# Patient Record
Sex: FEMALE | Race: WHITE | NOT HISPANIC OR LATINO | Employment: FULL TIME | URBAN - METROPOLITAN AREA
[De-identification: names, ages, dates, MRNs, and addresses within clinical notes are randomized per-mention and may not be internally consistent; named-entity substitution may affect disease eponyms.]

---

## 2021-04-30 ENCOUNTER — TELEPHONE (OUTPATIENT)
Dept: OBGYN CLINIC | Facility: CLINIC | Age: 51
End: 2021-04-30

## 2021-04-30 DIAGNOSIS — Z79.890 HORMONE REPLACEMENT THERAPY (HRT): Primary | ICD-10-CM

## 2021-04-30 NOTE — TELEPHONE ENCOUNTER
Ronna freed requesting refill of hormone birth control  Per [facility] 0 5-0 1   Returned call to patient, [facility]  Patient in agreement to schedule wa for Yarely   to c/b to schedule  Dr Blade Grossman, please generate script  She would like 90 day supply

## 2021-05-01 PROBLEM — Z79.890 HORMONE REPLACEMENT THERAPY (HRT): Status: ACTIVE | Noted: 2021-05-01

## 2021-05-01 NOTE — TELEPHONE ENCOUNTER
Does pt want script for HRT sent to [facility] mail scripts or local [facility]?  Please confirm, both are listed in [facility] and no pharmacy is listed in [address] [place] [address] Rd

## 2021-05-03 RX ORDER — ESTRADIOL AND NORETHINDRONE ACETATE 1; .5 MG/1; MG/1
1 TABLET ORAL DAILY
Qty: 84 TABLET | Refills: 0 | Status: SHIPPED | OUTPATIENT
Start: 2021-05-03 | End: 2021-06-11

## 2021-06-10 PROBLEM — N39.46 MIXED STRESS AND URGE URINARY INCONTINENCE: Status: ACTIVE | Noted: 2021-06-10

## 2021-06-11 ENCOUNTER — ANNUAL EXAM (OUTPATIENT)
Dept: OBGYN CLINIC | Facility: CLINIC | Age: 51
End: 2021-06-11
Payer: COMMERCIAL

## 2021-06-11 VITALS
DIASTOLIC BLOOD PRESSURE: 68 MMHG | BODY MASS INDEX: 23.67 KG/M2 | WEIGHT: 156.2 LBS | SYSTOLIC BLOOD PRESSURE: 102 MMHG | HEIGHT: 68 IN

## 2021-06-11 DIAGNOSIS — Z01.419 ENCOUNTER FOR ANNUAL ROUTINE GYNECOLOGICAL EXAMINATION: Primary | ICD-10-CM

## 2021-06-11 DIAGNOSIS — Z79.890 HORMONE REPLACEMENT THERAPY (HRT): ICD-10-CM

## 2021-06-11 DIAGNOSIS — Z12.31 ENCOUNTER FOR SCREENING MAMMOGRAM FOR MALIGNANT NEOPLASM OF BREAST: ICD-10-CM

## 2021-06-11 PROCEDURE — 99396 PREV VISIT EST AGE 40-64: CPT | Performed by: OBSTETRICS & GYNECOLOGY

## 2021-06-11 RX ORDER — BLOOD-GLUCOSE METER
EACH MISCELLANEOUS
COMMUNITY
Start: 2021-04-19

## 2021-06-11 RX ORDER — ESCITALOPRAM OXALATE 20 MG/1
TABLET ORAL
COMMUNITY

## 2021-06-11 RX ORDER — LORAZEPAM 0.5 MG/1
0.5 TABLET ORAL
COMMUNITY

## 2021-06-11 NOTE — PATIENT INSTRUCTIONS
- Maintain healthy weight with BMI ideally between 18-25     - Eat a healthy diet, including multiple servings of vegetables and fruits, as well as lean protein sources  - Get at least 150 minutes of moderate aerobic activity or 75 minutes of vigorous aerobic activity a week, or a combination of moderate and vigorous activity  Greater amounts of exercise will provide an even greater health benefit  - Ensure diet provides 1200mg of Calcium daily (divided) and 800IU of vitamin D, or take supplements to meet this

## 2021-06-11 NOTE — LETTER
2021     Silvia Magallon DO  [address] [facility] 1    Patient: Karen Waldron   YOB: 1970   Date of Visit: 2021       Dear Dr Dodson Barrier: Thank you for referring Karen Waldron to me for evaluation  Below are my notes for this consultation  If you have questions, please do not hesitate to call me  I look forward to following your patient along with you  Sincerely,        Gregor Magallon MD        CC: No Recipients  Gregor Magallon MD  2021  5:48 PM  Sign when Signing Visit  Annual Wellness Visit  [address]  [address] [facility], [facility] 1    ASSESSMENT/PLAN: Karen Waldron is a 46 y o   who presents for annual gynecologic exam     Encounter for routine gynecologic examination  - Routine well woman exam completed today  - Cervical Cancer Screening: Current ASCCP Guidelines reviewed  Last Pap: 2019 neg with neg HPV  Next Pap Due: 1-2 years, routine   - Contraceptive counseling discussed  Current contraception: postmenopausal:   - Breast Cancer Screening: Last Mammogram   normal per pt  - Colorectal cancer screening last , next due  per pt  - The following were reviewed in today's visit: mammography screening ordered, adequate intake of calcium and vitamin D, exercise and healthy diet    Additional problems addressed during this visit:  1  Encounter for annual routine gynecological examination    2  Encounter for screening mammogram for malignant neoplasm of breast  -     Mammo screening bilateral w 3d & cad; Future; Expected date: 2022    3  Hormone replacement therapy (HRT)  Assessment & Plan:  Pt previously on HRT for hot flashes  Stopped in March this year  Some have returned but not as bad as previously  Pt wishes to try herbal options and see if they improve with time  Gave pt handout of herbal options to try  Pt can call to restart if she wishes          Next visit: 1 year wellness      CC:  Annual Gynecologic Examination    HPI: Ian Coburn is a 46 y o   who presents for annual gynecologic examination  She denies any breast, urinary or pelvic issues at today's visit  Patient reports she ran out of HRT early may and although I did refill she decided to try off  Hasn't taken since early May  Hot flashes have returned but not as severe  Willing to try herbal       Gyn History  No LMP recorded  Patient is postmenopausal   Menstrual History  Period Duration (Days): menopause at 48yo  Last Pap: 2019 was normal    She  reports being sexually active and has had partner(s) who are Male  She reports using the following method of birth control/protection: Female Sterilization  Sexual History  Sexually Transmitted Infection History: None  Is Patient in a Monogamous Relationship?: Yes    OB History      Past Medical History:  No date: Anxiety  No date: Depression  No date: Endometriosis of pelvis  10/2019: History of mammogram      Comment:  Negative per pt  Done at [facility]  No date:  Hot flashes      Comment:  on HRT  2019: Papanicolaou smear  No date: Stress incontinence      Comment:  worse with allergy season     Past Surgical History:  2014: BLADDER SUSPENSION      Comment:  for stress incontinence   2014: CYSTOURETHROSCOPY  2016: ELBOW SURGERY      Comment:  tennis elbow   1985: HERNIA REPAIR  2014: STERILIZATION      Comment:  Essure     Family History   Problem Relation Age of Onset    Breast cancer Neg Hx     Uterine cancer Neg Hx     Ovarian cancer Neg Hx     Colon cancer Neg Hx         Social History     Tobacco Use    Smoking status: Former Smoker    Smokeless tobacco: Never Used   Vaping Use    Vaping Use: Never used   Substance Use Topics    Alcohol use: Yes     Comment: special occasions (1 or less/month)     Drug use: Never     Comment: No           Current Outpatient Medications:     Blood Glucose Monitoring Suppl (ONE TOUCH ULTRA 2) w/Device KIT, Use as directed, Disp: , Rfl:     escitalopram (LEXAPRO) 20 mg tablet, TAKE 1 TABLET ONCE DAILY, Disp: , Rfl:     LORazepam (ATIVAN) 0 5 mg tablet, Take 0 5 mg by mouth, Disp: , Rfl:     She is allergic to sulfa antibiotics       ROS negative except as noted in HPI    Objective:  /68   Ht 5' 7 5" (1 715 m)   Wt 70 9 kg (156 lb 3 2 oz)   Breastfeeding No   BMI 24 10 kg/m²      Physical Exam  Constitutional:       Appearance: Normal appearance  Chest:      Breasts:         Right: Normal  No mass or tenderness  Left: Normal  No mass or tenderness  Abdominal:      Palpations: Abdomen is soft  Tenderness: There is no abdominal tenderness  Genitourinary:     General: Normal vulva  Vagina: No bleeding or lesions  Cervix: Normal       Uterus: Normal  Not tender  Adnexa:         Right: No mass or tenderness  Left: No mass or tenderness  Rectum: No mass or external hemorrhoid  Normal anal tone  Comments: Atrophic changes appropriate to age  Musculoskeletal:         General: Normal range of motion  Lymphadenopathy:      Upper Body:      Right upper body: No axillary adenopathy  Left upper body: No axillary adenopathy  Neurological:      Mental Status: She is alert and oriented to person, place, and time     Psychiatric:         Mood and Affect: Mood normal          Behavior: Behavior normal

## 2021-06-11 NOTE — PROGRESS NOTES
Annual Wellness Visit  [address]   [address] [facility], [facility] [place] [facility]    ASSESSMENT/PLAN: Chevy Du is a 46 y o   who presents for annual gynecologic exam     Encounter for routine gynecologic examination  - Routine well woman exam completed today  - Cervical Cancer Screening: Current ASCCP Guidelines reviewed  Last Pap: 2019 neg with neg HPV  Next Pap Due: 1-2 years, routine   - Contraceptive counseling discussed  Current contraception: postmenopausal:   - Breast Cancer Screening: Last Mammogram   normal per pt  - Colorectal cancer screening last , next due  per pt  - The following were reviewed in today's visit: mammography screening ordered, adequate intake of calcium and vitamin D, exercise and healthy diet    Additional problems addressed during this visit:  1  Encounter for annual routine gynecological examination    2  Encounter for screening mammogram for malignant neoplasm of breast  -     Mammo screening bilateral w 3d & cad; Future; Expected date: 2022    3  Hormone replacement therapy (HRT)  Assessment & Plan:  Pt previously on HRT for hot flashes  Stopped in March this year  Some have returned but not as bad as previously  Pt wishes to try herbal options and see if they improve with time  Gave pt handout of herbal options to try  Pt can call to restart if she wishes  Next visit: 1 year wellness      CC:  Annual Gynecologic Examination    HPI: Chevy Du is a 46 y o   who presents for annual gynecologic examination  She denies any breast, urinary or pelvic issues at today's visit  Patient reports she ran out of HRT early may and although I did refill she decided to try off  Hasn't taken since early May  Hot flashes have returned but not as severe  Willing to try herbal       Gyn History  No LMP recorded   Patient is postmenopausal   Menstrual History  Period Duration (Days): menopause at 50yo  Last Pap: 2019 was normal    She  reports being sexually active and has had partner(s) who are Male  She reports using the following method of birth control/protection: Female Sterilization  Sexual History  Sexually Transmitted Infection History: None  Is Patient in a Monogamous Relationship?: Yes    OB History      Past Medical History:  No date: Anxiety  No date: Depression  No date: Endometriosis of pelvis  10/2019: History of mammogram      Comment:  Negative per pt  Done at [facility]  No date: Hot flashes      Comment:  on HRT  2019: Papanicolaou smear  No date: Stress incontinence      Comment:  worse with allergy season     Past Surgical History:  2014: BLADDER SUSPENSION      Comment:  for stress incontinence   2014: CYSTOURETHROSCOPY  2016: ELBOW SURGERY      Comment:  tennis elbow   1985: HERNIA REPAIR  2014: STERILIZATION      Comment:  Essure     Family History   Problem Relation Age of Onset    Breast cancer Neg Hx     Uterine cancer Neg Hx     Ovarian cancer Neg Hx     Colon cancer Neg Hx         Social History     Tobacco Use    Smoking status: Former Smoker    Smokeless tobacco: Never Used   Vaping Use    Vaping Use: Never used   Substance Use Topics    Alcohol use: Yes     Comment: special occasions (1 or less/month)     Drug use: Never     Comment: No           Current Outpatient Medications:     Blood Glucose Monitoring Suppl (ONE TOUCH ULTRA 2) w/Device KIT, Use as directed, Disp: , Rfl:     escitalopram (LEXAPRO) 20 mg tablet, TAKE 1 TABLET ONCE DAILY, Disp: , Rfl:     LORazepam (ATIVAN) 0 5 mg tablet, Take 0 5 mg by mouth, Disp: , Rfl:     She is allergic to sulfa antibiotics       ROS negative except as noted in HPI    Objective:  /68   Ht 5' 7 5" (1 715 m)   Wt 70 9 kg (156 lb 3 2 oz)   Breastfeeding No   BMI 24 10 kg/m²      Physical Exam  Constitutional:       Appearance: Normal appearance  Chest:      Breasts:         Right: Normal  No mass or tenderness  Left: Normal  No mass or tenderness  Abdominal:      Palpations: Abdomen is soft  Tenderness: There is no abdominal tenderness  Genitourinary:     General: Normal vulva  Vagina: No bleeding or lesions  Cervix: Normal       Uterus: Normal  Not tender  Adnexa:         Right: No mass or tenderness  Left: No mass or tenderness  Rectum: No mass or external hemorrhoid  Normal anal tone  Comments: Atrophic changes appropriate to age  Musculoskeletal:         General: Normal range of motion  Lymphadenopathy:      Upper Body:      Right upper body: No axillary adenopathy  Left upper body: No axillary adenopathy  Neurological:      Mental Status: She is alert and oriented to person, place, and time     Psychiatric:         Mood and Affect: Mood normal          Behavior: Behavior normal

## 2021-06-19 NOTE — ASSESSMENT & PLAN NOTE
Pt previously on HRT for hot flashes  Stopped in March this year  Some have returned but not as bad as previously  Pt wishes to try herbal options and see if they improve with time  Gave pt handout of herbal options to try  Pt can call to restart if she wishes

## 2021-09-21 ENCOUNTER — VBI (OUTPATIENT)
Dept: ADMINISTRATIVE | Facility: OTHER | Age: 51
End: 2021-09-21

## 2021-09-21 NOTE — TELEPHONE ENCOUNTER
Upon review of the In Basket request we were able to locate, review, and update the patient chart as requested for Pap Smear (HPV) aka Cervical Cancer Screening  Any additional questions or concerns should be emailed to the Practice Liaisons via [email or web address] email, please do not reply via In Basket      Thank you  Dago Tovar

## 2022-07-28 NOTE — PATIENT INSTRUCTIONS
- Maintain healthy weight with BMI ideally between 18-25     - Eat a healthy diet, including multiple servings of vegetables and fruits, as well as lean protein sources  - Get at least 150 minutes of moderate aerobic activity or 75 minutes of vigorous aerobic activity a week, or a combination of moderate and vigorous activity  Greater amounts of exercise will provide an even greater health benefit  - Ensure diet provides 1200mg of Calcium daily (divided) and 800IU of vitamin D, or take supplements to meet this  - Safe sex practices recommended  - Resources - information on birth control and sexually transmitted infections - www bedsider  org            - information on sexually transmitted infections - www cdc gov/std/    Strong Bones at Every Age    What is Calcium and What Does it Do? Calcium is a mineral that is necessary for life  In addition to building bones and keeping them healthy, calcium helps our blood clot, nerves send messages and muscles contract  About 99 percent of the calcium in our bodies is in our bones and teeth  Each day, we lose calcium through our skin, nails, hair, sweat, urine and feces, but our bodies cannot produce new calcium  That's why it's important to try to get calcium from the food we eat  When we don't get enough calcium for our body's needs, it is taken from our bones  Daily Recommendations for Ca and Vit D (FDA, IOM)   Age Calcium Vitamin D   Teen - 54yo 1,000mg 600IU   50yo and older 1,200mg 800IU   Maximum Daily 2,000-2,500mg 4,000IU     Calcium should be spread out throughout the day for better absorption, ~500mg at a time  Find a Calcium Calculator @ www  osteoporosis  foundation/educational-hub/topic/calcium-calculator    Vitamin D: Vitamin D is in fish oils, some vegetables, and fortified milk, cereal, and bread  Vitamin D is also formed in the skin when it is exposed to the sun       Dietary Calcium Amounts  Food Amount     Calcium (mg)      Milk (skim, low fat, whole) 1 cup 300       Cottage Cheese 1/2 cup 65   Ice Cream or Ice Milk 1/2 cup 100   Sour Cream, cultured 1 cup 250   Soy Milk, calcium fortified 1 cup 200 to 400   Yogurt 1 cup 450   Yogurt drink 12 oz 300   Nonfat dry milk powder 5 Tbsp 300   Brie Cheese 1 oz 50   Hard Cheese (cheddar, nate)     1 oz 200   Mozzarella 1 oz 200   Parmesan Cheese 1 Tbsp 70   Broccoli, cooked 1 cup 180   Kale, raw 1 cup 55   Orange Juice, Ca++ fortified 1 cup 300   Spinach, cooked 1 cup 240

## 2022-07-29 ENCOUNTER — ANNUAL EXAM (OUTPATIENT)
Dept: OBGYN CLINIC | Facility: CLINIC | Age: 52
End: 2022-07-29
Payer: COMMERCIAL

## 2022-07-29 VITALS — WEIGHT: 150 LBS | SYSTOLIC BLOOD PRESSURE: 108 MMHG | BODY MASS INDEX: 23.15 KG/M2 | DIASTOLIC BLOOD PRESSURE: 60 MMHG

## 2022-07-29 DIAGNOSIS — Z01.419 ENCOUNTER FOR ANNUAL ROUTINE GYNECOLOGICAL EXAMINATION: Primary | ICD-10-CM

## 2022-07-29 DIAGNOSIS — Z79.890 HORMONE REPLACEMENT THERAPY (HRT): ICD-10-CM

## 2022-07-29 DIAGNOSIS — Z12.31 ENCOUNTER FOR SCREENING MAMMOGRAM FOR MALIGNANT NEOPLASM OF BREAST: ICD-10-CM

## 2022-07-29 DIAGNOSIS — Z12.4 CERVICAL CANCER SCREENING: ICD-10-CM

## 2022-07-29 PROCEDURE — S0612 ANNUAL GYNECOLOGICAL EXAMINA: HCPCS | Performed by: OBSTETRICS & GYNECOLOGY

## 2022-07-29 NOTE — PROGRESS NOTES
Annual Wellness Visit  [address]   [address], [facility], [facility] 1    ASSESSMENT/PLAN: Nathan Calix is a 45 y o   who presents for annual gynecologic exam     Encounter for routine gynecologic examination  - Routine well woman exam completed today  - Cervical Cancer Screening: Current ASCCP Guidelines reviewed  Last Pap: 2019 normal  Next Pap Due: today, routine   - Contraceptive counseling discussed  Current contraception: postmenopausal  - Breast Cancer Screening: Last Mammogram 10/2021 normal  - Colorectal cancer screening last , next due   - The following were reviewed in today's visit: mammography screening ordered, use and side effects of HRT, adequate intake of calcium and vitamin D, exercise and healthy diet    Additional problems addressed during this visit:  1  Encounter for annual routine gynecological examination    2  Encounter for screening mammogram for malignant neoplasm of breast  -     Mammo screening bilateral w 3d & cad; Future; Expected date: 2023    3  Cervical cancer screening  -     IGP, Aptima HPV, Rfx 16/18,45    4  Hormone replacement therapy (HRT)  Assessment & Plan:  Has been off of HRT for 1 year  Doing well  Was using black cohash but stopped and only occasional hot flashes  Doesn't feel she needs anything  Next visit: 1 year Wellness      CC:  Annual Gynecologic Examination    HPI: Nathan Calix is a 45 y o   who presents for annual gynecologic examination  She denies any breast, urinary or pelvic issues at today's visit  No postmenopausal bleeding  Hot flashes significantly decrease  Sexually active occasionally, some dryness  Uses lubricant  Gyn History  No LMP recorded  Patient is postmenopausal      Last Pap: 2019 was normal    She  reports being sexually active and has had partner(s) who are male   She reports using the following method of birth control/protection: Female Sterilization  OB History      Past Medical History:  No date: Anxiety and depression  No date: Endometriosis of pelvis  10/19/2021: History of screening mammography      Comment:  negative per pt  Done at [facility]  No date: Stress incontinence      Comment:  worse with allergy season     Past Surgical History:  2014: BLADDER SUSPENSION      Comment:  for stress incontinence   2014: CYSTOURETHROSCOPY  2016: ELBOW SURGERY      Comment:  tennis elbow   1985: HERNIA REPAIR  2014: STERILIZATION      Comment:  Essure     Family History   Problem Relation Age of Onset    Breast cancer Neg Hx     Uterine cancer Neg Hx     Ovarian cancer Neg Hx     Colon cancer Neg Hx         Social History     Tobacco Use    Smoking status: Former Smoker    Smokeless tobacco: Never Used   Vaping Use    Vaping Use: Never used   Substance Use Topics    Alcohol use: Yes     Comment: special occasions (1 or less/month)     Drug use: Never     Comment: No           Current Outpatient Medications:     Blood Glucose Monitoring Suppl (ONE TOUCH ULTRA 2) w/Device KIT, Use as directed, Disp: , Rfl:     escitalopram (LEXAPRO) 20 mg tablet, TAKE 1 TABLET ONCE DAILY, Disp: , Rfl:     LORazepam (ATIVAN) 0 5 mg tablet, Take 0 5 mg by mouth, Disp: , Rfl:     She is allergic to dust mite extract and sulfa antibiotics       ROS negative except as noted in HPI    Objective:  /60   Wt 68 kg (150 lb)   Breastfeeding No   BMI 23 15 kg/m²      Physical Exam  Constitutional:       Appearance: Normal appearance  Chest:   Breasts:      Right: Normal  No mass, tenderness or axillary adenopathy  Left: Normal  No mass, tenderness or axillary adenopathy  Abdominal:      Palpations: Abdomen is soft  Tenderness: There is no abdominal tenderness  Genitourinary:     General: Normal vulva  Vagina: No bleeding or lesions  Cervix: Normal       Uterus: Normal  Not tender         Adnexa:         Right: No mass or tenderness  Left: No mass or tenderness  Rectum: No mass or external hemorrhoid  Normal anal tone  Comments: Atrophic changes appropriate to age  Musculoskeletal:         General: Normal range of motion  Lymphadenopathy:      Upper Body:      Right upper body: No axillary adenopathy  Left upper body: No axillary adenopathy  Neurological:      Mental Status: She is alert and oriented to person, place, and time     Psychiatric:         Mood and Affect: Mood normal          Behavior: Behavior normal

## 2022-07-29 NOTE — LETTER
2022     Tarah Anes, [address] [facility],[address] [place] [address]    Patient: Vi Gerardo   YOB: 1970   Date of Visit: 2022       Dear Dr Liliana Francis: Thank you for referring Vi Gerardo to me for evaluation  Below are my notes for this consultation  If you have questions, please do not hesitate to call me  I look forward to following your patient along with you  Sincerely,        Garfield Quinteros MD        CC: No Recipients  Garfield Quinteros MD  2022  1:17 PM  Sign when Signing Visit  Annual Wellness Visit  [address]   [address], [address], [facility], [place] [address]    ASSESSMENT/PLAN: Vi Gerardo is a 45 y o   who presents for annual gynecologic exam     Encounter for routine gynecologic examination  - Routine well woman exam completed today  - Cervical Cancer Screening: Current ASCCP Guidelines reviewed  Last Pap: 2019 normal  Next Pap Due: today, routine   - Contraceptive counseling discussed  Current contraception: postmenopausal  - Breast Cancer Screening: Last Mammogram 10/2021 normal  - Colorectal cancer screening last , next due   - The following were reviewed in today's visit: mammography screening ordered, use and side effects of HRT, adequate intake of calcium and vitamin D, exercise and healthy diet    Additional problems addressed during this visit:  1  Encounter for annual routine gynecological examination    2  Encounter for screening mammogram for malignant neoplasm of breast  -     Mammo screening bilateral w 3d & cad; Future; Expected date: 2023    3  Cervical cancer screening  -     IGP, Aptima HPV, Rfx 16/18,45    4  Hormone replacement therapy (HRT)  Assessment & Plan:  Has been off of HRT for 1 year  Doing well  Was using black cohash but stopped and only occasional hot flashes  Doesn't feel she needs anything          Next visit: 1 year Wellness      CC:  Annual Gynecologic Examination    HPI: Destini Hood is a 45 y o   who presents for annual gynecologic examination  She denies any breast, urinary or pelvic issues at today's visit  No postmenopausal bleeding  Hot flashes significantly decrease  Sexually active occasionally, some dryness  Uses lubricant  Gyn History  No LMP recorded  Patient is postmenopausal      Last Pap: 2019 was normal    She  reports being sexually active and has had partner(s) who are male  She reports using the following method of birth control/protection: Female Sterilization  OB History      Past Medical History:  No date: Anxiety and depression  No date: Endometriosis of pelvis  10/19/2021: History of screening mammography      Comment:  negative per pt  Done at [facility]  No date: Stress incontinence      Comment:  worse with allergy season     Past Surgical History:  2014: BLADDER SUSPENSION      Comment:  for stress incontinence   2014: CYSTOURETHROSCOPY  2016: ELBOW SURGERY      Comment:  tennis elbow   1985: HERNIA REPAIR  2014: STERILIZATION      Comment:  Essure     Family History   Problem Relation Age of Onset    Breast cancer Neg Hx     Uterine cancer Neg Hx     Ovarian cancer Neg Hx     Colon cancer Neg Hx         Social History     Tobacco Use    Smoking status: Former Smoker    Smokeless tobacco: Never Used   Vaping Use    Vaping Use: Never used   Substance Use Topics    Alcohol use: Yes     Comment: special occasions (1 or less/month)     Drug use: Never     Comment: No           Current Outpatient Medications:     Blood Glucose Monitoring Suppl (ONE TOUCH ULTRA 2) w/Device KIT, Use as directed, Disp: , Rfl:     escitalopram (LEXAPRO) 20 mg tablet, TAKE 1 TABLET ONCE DAILY, Disp: , Rfl:     LORazepam (ATIVAN) 0 5 mg tablet, Take 0 5 mg by mouth, Disp: , Rfl:     She is allergic to dust mite extract and sulfa antibiotics       ROS negative except as noted in HPI    Objective:  /60   Wt 68 kg (150 lb)   Breastfeeding No   BMI 23 15 kg/m²      Physical Exam  Constitutional:       Appearance: Normal appearance  Chest:   Breasts:      Right: Normal  No mass, tenderness or axillary adenopathy  Left: Normal  No mass, tenderness or axillary adenopathy  Abdominal:      Palpations: Abdomen is soft  Tenderness: There is no abdominal tenderness  Genitourinary:     General: Normal vulva  Vagina: No bleeding or lesions  Cervix: Normal       Uterus: Normal  Not tender  Adnexa:         Right: No mass or tenderness  Left: No mass or tenderness  Rectum: No mass or external hemorrhoid  Normal anal tone  Comments: Atrophic changes appropriate to age  Musculoskeletal:         General: Normal range of motion  Lymphadenopathy:      Upper Body:      Right upper body: No axillary adenopathy  Left upper body: No axillary adenopathy  Neurological:      Mental Status: She is alert and oriented to person, place, and time     Psychiatric:         Mood and Affect: Mood normal          Behavior: Behavior normal

## 2022-07-29 NOTE — ASSESSMENT & PLAN NOTE
Has been off of HRT for 1 year  Doing well  Was using black cohash but stopped and only occasional hot flashes  Doesn't feel she needs anything

## 2022-08-02 LAB
CYTOLOGIST CVX/VAG CYTO: NORMAL
DX ICD CODE: NORMAL
HPV I/H RISK 4 DNA CVX QL PROBE+SIG AMP: NEGATIVE
OTHER STN SPEC: NORMAL
PATH REPORT.FINAL DX SPEC: NORMAL
SL AMB NOTE:: NORMAL
SL AMB SPECIMEN ADEQUACY: NORMAL
SL AMB TEST METHODOLOGY: NORMAL

## 2023-08-24 ENCOUNTER — TELEPHONE (OUTPATIENT)
Age: 53
End: 2023-08-24

## 2023-08-24 ENCOUNTER — PREP FOR PROCEDURE (OUTPATIENT)
Age: 53
End: 2023-08-24

## 2023-08-24 DIAGNOSIS — Z86.010 HISTORY OF COLON POLYPS: Primary | ICD-10-CM

## 2023-08-24 NOTE — TELEPHONE ENCOUNTER
[address] [facility],[address] Assessment    Name: Tommy Chávez  YOB: 1970  Last Height: 5' 8"   Last weight:(140 lb)  BMI: 21.3  Procedure: Colon  Diagnosis: history of polyps  Date of procedure: 9/26/23  Prep: miralax/Dulcolax  Responsible : Jhon Proctor  Phone#: [phone]  Name completing form: Debbi Fuentes  Date form completed: 08/24/23      If the patient answers yes to any of these questions, schedule in a hospital  Are you pregnant: No  Do you rely on a wheelchair for mobility: No  Have you been diagnosed with End Stage Renal Disease (ESRD): No  Do you need oxygen during the day: No  Have you had a heart attack or stroke within the past three months: No  Have you had a seizure within the past three months: No  Have you ever been informed by anesthesia that you have a difficult airway: No  Additional Questions  Have you had any cardiac testing or are under the care of a Cardiologist (see cardiac list): No  Cardiac list:   Do you have an implanted cardiac defibrillator: No (Comment:  This patient should be scheduled in the hospital)    Have any bleeding problems, such as anemia or hemophilia (If patient has H&H result below 8, schedule in hospital.  H&H must be within 30 days of procedure): No    Had an organ transplant within the past 3 months: No    Do you have any present infections: No  Do you get short of breath when walking a few blocks: No  Have you been diagnosed with diabetes: No  Comments (provide cardiac provider information if applicable):

## 2023-08-24 NOTE — TELEPHONE ENCOUNTER
Scheduled date of colonoscopy (as of today): 09/26/2023  Physician performing colonoscopy: Dr Kaleb Segura  Location of colonoscopy: [facility]  Bowel prep reviewed with patient: Miralax/Dulcolax  Prep instrutions emailed to pt    Clearances: N/A

## 2023-08-24 NOTE — TELEPHONE ENCOUNTER
08/24/23  Screened by: Frandy Monzon    Referring Provider     Pre- Screening: There is no height or weight on file to calculate BMI. Has patient been referred for a routine screening Colonoscopy? yes  Is the patient between 43-73 years old? yes      Previous Colonoscopy yes   If yes:    Date: 5yrs ago august    Facility:     Reason:       SCHEDULING STAFF: If the patient is between 45yrs-49yrs, please advise patient to confirm benefits/coverage with their insurance company for a routine screening colonoscopy, some insurance carriers will only cover at [address] [place] [address] or older. If the patient is over 66years old, please schedule an office visit. Does the patient want to see a Gastroenterologist prior to their procedure OR are they having any GI symptoms? no    Has the patient been hospitalized or had abdominal surgery in the past 6 months? no    Does the patient use supplemental oxygen? no    Does the patient take Coumadin, Lovenox, Plavix, Elliquis, Xarelto, or other blood thinning medication? no    Has the patient had a stroke, cardiac event, or stent placed in the past year? no     Passed OA    SCHEDULING STAFF: If patient answers NO to above questions, then schedule procedure. If patient answers YES to above questions, then schedule office appointment. If patient is between 45yrs - 49yrs, please advise patient that we will have to confirm benefits & coverage with their insurance company for a routine screening colonoscopy.

## 2023-09-19 ENCOUNTER — TELEPHONE (OUTPATIENT)
Dept: GASTROENTEROLOGY | Facility: CLINIC | Age: 53
End: 2023-09-19

## 2023-09-19 NOTE — TELEPHONE ENCOUNTER
Procedure confirmed  Colonoscopy     Via: Spoke with patient. Instructions given: Email     Prep Given: Miralax/Dulcolax    Call the office if there are any questions.

## 2023-09-26 ENCOUNTER — HOSPITAL ENCOUNTER (OUTPATIENT)
Dept: GASTROENTEROLOGY | Facility: AMBULATORY SURGERY CENTER | Age: 53
Discharge: HOME | End: 2023-09-26
Attending: INTERNAL MEDICINE
Payer: COMMERCIAL

## 2023-09-26 ENCOUNTER — ANESTHESIA (OUTPATIENT)
Dept: GASTROENTEROLOGY | Facility: AMBULATORY SURGERY CENTER | Age: 53
End: 2023-09-26

## 2023-09-26 ENCOUNTER — ANESTHESIA EVENT (OUTPATIENT)
Dept: GASTROENTEROLOGY | Facility: AMBULATORY SURGERY CENTER | Age: 53
End: 2023-09-26

## 2023-09-26 VITALS
SYSTOLIC BLOOD PRESSURE: 134 MMHG | RESPIRATION RATE: 16 BRPM | HEIGHT: 68 IN | TEMPERATURE: 97.8 F | WEIGHT: 145 LBS | DIASTOLIC BLOOD PRESSURE: 58 MMHG | BODY MASS INDEX: 21.98 KG/M2 | OXYGEN SATURATION: 98 % | HEART RATE: 58 BPM

## 2023-09-26 DIAGNOSIS — Z86.010 HISTORY OF COLON POLYPS: ICD-10-CM

## 2023-09-26 PROBLEM — F32.A ANXIETY AND DEPRESSION: Status: ACTIVE | Noted: 2023-09-26

## 2023-09-26 PROBLEM — F41.9 ANXIETY AND DEPRESSION: Status: ACTIVE | Noted: 2023-09-26

## 2023-09-26 PROCEDURE — 45380 COLONOSCOPY AND BIOPSY: CPT | Performed by: INTERNAL MEDICINE

## 2023-09-26 PROCEDURE — 88305 TISSUE EXAM BY PATHOLOGIST: CPT | Performed by: SPECIALIST

## 2023-09-26 RX ORDER — PROPOFOL 10 MG/ML
INJECTION, EMULSION INTRAVENOUS AS NEEDED
Status: DISCONTINUED | OUTPATIENT
Start: 2023-09-26 | End: 2023-09-26

## 2023-09-26 RX ORDER — SODIUM CHLORIDE, SODIUM LACTATE, POTASSIUM CHLORIDE, CALCIUM CHLORIDE 600; 310; 30; 20 MG/100ML; MG/100ML; MG/100ML; MG/100ML
50 INJECTION, SOLUTION INTRAVENOUS CONTINUOUS
Status: DISCONTINUED | OUTPATIENT
Start: 2023-09-26 | End: 2023-09-30 | Stop reason: HOSPADM

## 2023-09-26 RX ADMIN — SODIUM CHLORIDE, SODIUM LACTATE, POTASSIUM CHLORIDE, CALCIUM CHLORIDE 50 ML/HR: 600; 310; 30; 20 INJECTION, SOLUTION INTRAVENOUS at 09:09

## 2023-09-26 RX ADMIN — PROPOFOL 100 MG: 10 INJECTION, EMULSION INTRAVENOUS at 09:32

## 2023-09-26 RX ADMIN — PROPOFOL 50 MG: 10 INJECTION, EMULSION INTRAVENOUS at 09:36

## 2023-09-26 RX ADMIN — SODIUM CHLORIDE, SODIUM LACTATE, POTASSIUM CHLORIDE, CALCIUM CHLORIDE: 600; 310; 30; 20 INJECTION, SOLUTION INTRAVENOUS at 09:46

## 2023-09-26 RX ADMIN — PROPOFOL 100 MG: 10 INJECTION, EMULSION INTRAVENOUS at 09:30

## 2023-09-26 NOTE — H&P
History and Physical - [facility]  Aaliyah Vega 52 y.o. female MRN: [id number]    HPI: Aaliyah Vega is a 52 y.o. female who presents for colon polyp surveillance colonoscopy. She had a colonoscopy in 2018 that showed a tubular adenoma    REVIEW OF SYSTEMS: Per the HPI, and otherwise unremarkable. Historical Information   Past Medical History:   Diagnosis Date   • Anxiety and depression    • Colon polyp     pt not sure but told to return in 5 years   • Endometriosis of pelvis    • History of screening mammography 10/19/2021    negative per pt. Done at [facility].    • Stress incontinence     worse with allergy season     Past Surgical History:   Procedure Laterality Date   • BLADDER SUSPENSION      for stress incontinence    • CYSTOURETHROSCOPY     • ELBOW SURGERY  2016    tennis elbow    • HERNIA REPAIR     • STERILIZATION      Essure     Social History   Social History     Substance and Sexual Activity   Alcohol Use Yes   • Alcohol/week: 2.0 standard drinks of alcohol   • Types: 2 Glasses of wine per week    Comment: daily     Social History     Substance and Sexual Activity   Drug Use Never    Comment: No      Social History     Tobacco Use   Smoking Status Former   • Types: Cigarettes   • Quit date:    • Years since quittin.7   Smokeless Tobacco Never     Family History   Problem Relation Age of Onset   • Breast cancer Neg Hx    • Uterine cancer Neg Hx    • Ovarian cancer Neg Hx    • Colon cancer Neg Hx        Meds/Allergies       Current Outpatient Medications:   •  Blood Glucose Monitoring Suppl (ONE TOUCH ULTRA 2) w/Device KIT  •  escitalopram (LEXAPRO) 20 mg tablet  •  LORazepam (ATIVAN) 0.5 mg tablet    Current Facility-Administered Medications:   •  lactated ringers infusion, 50 mL/hr, Intravenous, Continuous, Continue from Pre-op at 23 0919    Allergies   Allergen Reactions   • Dust Mite Extract Other (See Comments)   • Sulfa Antibiotics Nausea Only Objective     /56   Pulse 62   Temp 97.8 °F (36.6 °C) (Temporal)   Resp 18   Ht 5' 8" (1.727 m)   Wt 65.8 kg (145 lb)   SpO2 100%   BMI 22.05 kg/m²     PHYSICAL EXAM    Gen: NAD AAOx3  Head: Normocephalic, Atraumatic  CV: S1S2 RRR no m/r/g  CHEST: Clear b/l no c/r/w  ABD: soft, +BS NT/ND  EXT: no edema    ASSESSMENT/PLAN:  This is a 52y.o. year old female here for colon polyp surveillance colonoscopy, and she is stable and optimized for her procedure.

## 2023-09-26 NOTE — ANESTHESIA POSTPROCEDURE EVALUATION
Post-Op Assessment Note    CV Status:  Stable  Pain Score: 0    Pain management: adequate     Mental Status:  Alert and awake   Hydration Status:  Euvolemic   PONV Controlled:  Controlled   Airway Patency:  Patent      Post Op Vitals Reviewed: Yes      Staff: CRNA         No notable events documented.     BP   106/58   Temp   98   Pulse  57   Resp   16   SpO2   99

## 2023-09-26 NOTE — ANESTHESIA PREPROCEDURE EVALUATION
Procedure:  COLONOSCOPY    Relevant Problems   ANESTHESIA (within normal limits)   (-) History of anesthesia complications      CARDIO   (-) Chest pain   (-) ALBARADO (dyspnea on exertion)      NEURO/PSYCH   (+) Anxiety and depression      PULMONARY   (-) Shortness of breath   (-) URI (upper respiratory infection)        Physical Exam    Airway    Mallampati score: II  TM Distance: >3 FB  Neck ROM: full     Dental       Cardiovascular      Pulmonary      Other Findings        Anesthesia Plan  ASA Score- 2     Anesthesia Type- IV sedation with anesthesia with ASA Monitors. Additional Monitors:   Airway Plan:           Plan Factors-Exercise tolerance (METS): >4 METS. Chart reviewed. EKG reviewed. Existing labs reviewed. Patient summary reviewed. Induction- intravenous. Postoperative Plan-     Informed Consent- Anesthetic plan and risks discussed with patient. I personally reviewed this patient with the CRNA. Discussed and agreed on the Anesthesia Plan with the CRNA. Tory Pritchard

## 2023-09-30 PROCEDURE — 88305 TISSUE EXAM BY PATHOLOGIST: CPT | Performed by: SPECIALIST

## 2023-12-07 NOTE — PATIENT INSTRUCTIONS
- Maintain healthy weight with BMI ideally between 18-25.    - Eat a healthy diet, including multiple servings of vegetables and fruits, as well as lean protein sources. - Get at least 150 minutes of moderate aerobic activity or 75 minutes of vigorous aerobic activity a week, or a combination of moderate and vigorous activity. Greater amounts of exercise will provide an even greater health benefit. - Ensure diet provides 1200mg of Calcium daily (divided) and 800IU of vitamin D, or take supplements to meet this. - Safe sex practices recommended. - Resources - information on birth control and sexually transmitted infections - www.bedsider. org            - information on sexually transmitted infections - www.cdc.gov/std/     Strong Bones at Every Age    What is Calcium and What Does it Do? Calcium is a mineral that is necessary for life. In addition to building bones and keeping them healthy, calcium helps our blood clot, nerves send messages and muscles contract. About 99 percent of the calcium in our bodies is in our bones and teeth. Each day, we lose calcium through our skin, nails, hair, sweat, urine and feces, but our bodies cannot produce new calcium. That's why it's important to try to get calcium from the food we eat. When we don't get enough calcium for our body's needs, it is taken from our bones. Daily Recommendations for Ca and Vit D (FDA, IOM)   Age Calcium Vitamin D   Teen - 52yo 1,000mg 600IU   52yo and older 1,200mg 800IU   Maximum Daily 2,000-2,500mg 4,000IU     Calcium should be spread out throughout the day for better absorption, ~500mg at a time. Find a Calcium Calculator @ www. osteoporosis. foundation/educational-hub/topic/calcium-calculator    Vitamin D: Vitamin D is in fish oils, some vegetables, and fortified milk, cereal, and bread. Vitamin D is also formed in the skin when it is exposed to the sun.      Dietary Calcium Amounts  Food Amount     Calcium (mg)      Milk (skim, low fat, whole) 1 cup 300       Cottage Cheese 1/2 cup 65   Ice Cream or Ice Milk 1/2 cup 100   Sour Cream, cultured 1 cup 250   Soy Milk, calcium fortified 1 cup 200 to 400   Yogurt 1 cup 450   Yogurt drink 12 oz 300   Nonfat dry milk powder 5 Tbsp 300   Brie Cheese 1 oz 50   Hard Cheese (cheddar, nate)     1 oz 200   Mozzarella 1 oz 200   Parmesan Cheese 1 Tbsp 70   Broccoli, cooked 1 cup 180   Kale, raw 1 cup 55   Orange Juice, Ca++ fortified 1 cup 300   Spinach, cooked 1 cup 240

## 2023-12-08 ENCOUNTER — ANNUAL EXAM (OUTPATIENT)
Dept: OBGYN CLINIC | Facility: CLINIC | Age: 53
End: 2023-12-08
Payer: COMMERCIAL

## 2023-12-08 VITALS
WEIGHT: 142.6 LBS | HEIGHT: 67 IN | DIASTOLIC BLOOD PRESSURE: 60 MMHG | SYSTOLIC BLOOD PRESSURE: 118 MMHG | BODY MASS INDEX: 22.38 KG/M2

## 2023-12-08 DIAGNOSIS — Z12.31 ENCOUNTER FOR SCREENING MAMMOGRAM FOR MALIGNANT NEOPLASM OF BREAST: ICD-10-CM

## 2023-12-08 DIAGNOSIS — Z23 FLU VACCINE NEED: ICD-10-CM

## 2023-12-08 DIAGNOSIS — Z01.419 ENCOUNTER FOR ANNUAL ROUTINE GYNECOLOGICAL EXAMINATION: Primary | ICD-10-CM

## 2023-12-08 PROCEDURE — 90471 IMMUNIZATION ADMIN: CPT | Performed by: OBSTETRICS & GYNECOLOGY

## 2023-12-08 PROCEDURE — S0612 ANNUAL GYNECOLOGICAL EXAMINA: HCPCS | Performed by: OBSTETRICS & GYNECOLOGY

## 2023-12-08 PROCEDURE — 90686 IIV4 VACC NO PRSV 0.5 ML IM: CPT | Performed by: OBSTETRICS & GYNECOLOGY

## 2023-12-08 RX ORDER — DIPHENHYDRAMINE HCL 25 MG
CAPSULE ORAL EVERY 24 HOURS
COMMUNITY

## 2023-12-08 NOTE — PROGRESS NOTES
Annual Wellness Visit  [address]  [address] [facility], [address], [place], [address] [facility]    ASSESSMENT/PLAN: Jose Guadalupe Balderrama is a 52 y.o.  who presents for annual gynecologic exam.    Encounter for routine gynecologic examination  - Routine well woman exam completed today. - Cervical Cancer Screening: Current ASCCP Guidelines reviewed. Last Pap: 2022 normal. Next Pap Due: 2 years, routine.  - Contraceptive counseling discussed. Current contraception: postmenopausal  - Breast Cancer Screening: Last Mammogram 10/2023 normal per pt  - Colorectal cancer screening last 2023, next due . - The following were reviewed in today's visit: mammography screening ordered, adequate intake of calcium and vitamin D, exercise, and healthy diet    Additional problems addressed during this visit:  1. Encounter for annual routine gynecological examination    2. Encounter for screening mammogram for malignant neoplasm of breast  -     Mammo screening bilateral w 3d & cad; Future    3. Flu vaccine need  -     influenza vaccine, quadrivalent, 0.5 mL, preservative-free, for adult and pediatric patients 6 mos+ (AFLURIA, FLUARIX, FLULAVAL, FLUZONE)        Next visit: 1 year Wellness      CC:  Annual Gynecologic Examination    HPI: Jose Guadalupe Balderrama is a 52 y.o.  who presents for annual gynecologic examination. She denies any breast, urinary or pelvic issues at today's visit. Sexually active but no often, not interested. That's okay with her. Gyn History  No LMP recorded. Patient is postmenopausal.     Last Pap: 2022 was normal    She  reports being sexually active and has had partner(s) who are male. She reports using the following method of birth control/protection: Female Sterilization. OB History      Past Medical History:  No date:  Anxiety and depression  No date: Colon polyp      Comment:  pt not sure but told to return in 5 years  10/2023: COVID-19  No date: Endometriosis of pelvis  10/2023: History of screening mammography      Comment:  Negative per pt. Done at [facility]  No date: Stress incontinence      Comment:  worse with allergy season     Past Surgical History:  2014: BLADDER SUSPENSION      Comment:  for stress incontinence   2014: CYSTOURETHROSCOPY  2016: ELBOW SURGERY      Comment:  tennis elbow   1985: HERNIA REPAIR  2014: STERILIZATION      Comment:  Essure     Family History   Problem Relation Age of Onset    Breast cancer Neg Hx     Uterine cancer Neg Hx     Ovarian cancer Neg Hx     Colon cancer Neg Hx         Social History     Tobacco Use    Smoking status: Former     Types: Cigarettes     Quit date:      Years since quittin.9    Smokeless tobacco: Never   Vaping Use    Vaping Use: Never used   Substance Use Topics    Alcohol use: Yes     Alcohol/week: 2.0 standard drinks of alcohol     Types: 2 Glasses of wine per week     Comment: daily    Drug use: Never     Comment: No           Current Outpatient Medications:     Blood Glucose Monitoring Suppl (ONE TOUCH ULTRA 2) w/Device KIT, Use as directed, Disp: , Rfl:     diphenhydrAMINE (BENADRYL) 25 mg capsule, every 24 hours, Disp: , Rfl:     escitalopram (LEXAPRO) 20 mg tablet, TAKE 1 TABLET ONCE DAILY, Disp: , Rfl:     LORazepam (ATIVAN) 0.5 mg tablet, Take 0.5 mg by mouth, Disp: , Rfl:     She is allergic to dust mite extract and sulfa antibiotics. .    ROS negative except as noted in HPI    Objective:  /60   Ht 5' 6.5" (1.689 m)   Wt 64.7 kg (142 lb 9.6 oz)   Breastfeeding No   BMI 22.67 kg/m²      Physical Exam  Constitutional:       Appearance: Normal appearance. Chest:   Breasts:     Right: Normal. No mass or tenderness. Left: Normal. No mass or tenderness. Abdominal:      Palpations: Abdomen is soft. Tenderness: There is no abdominal tenderness. Genitourinary:     General: Normal vulva. Vagina: No bleeding or lesions.       Cervix: Normal.      Uterus: Normal. Not tender. Adnexa:         Right: No mass or tenderness. Left: No mass or tenderness. Rectum: No mass or external hemorrhoid. Normal anal tone. Comments: Atrophic changes appropriate to age  Musculoskeletal:         General: Normal range of motion. Lymphadenopathy:      Upper Body:      Right upper body: No axillary adenopathy. Left upper body: No axillary adenopathy. Neurological:      Mental Status: She is alert and oriented to person, place, and time.    Psychiatric:         Mood and Affect: Mood normal.         Behavior: Behavior normal.

## 2023-12-08 NOTE — LETTER
2023     Jaspal Cervantes, [address] [facility] [address] [facility]    Patient: Viktoria Qureshi   YOB: 1970   Date of Visit: 2023       Dear Dr. Tiffany Pereira: Thank you for referring Viktoria Qureshi to me for evaluation. Below are my notes for this consultation. If you have questions, please do not hesitate to call me. I look forward to following your patient along with you. Sincerely,        Mary Ann Vargas MD        CC: No Recipients    Mary Ann Vargas MD  2023 12:50 PM  Sign when Signing Visit  Annual Wellness Visit  [address]  [address] [facility], [address], [facility], [address] [facility]    ASSESSMENT/PLAN: Viktoria Qurehsi is a 52 y.o.  who presents for annual gynecologic exam.    Encounter for routine gynecologic examination  - Routine well woman exam completed today. - Cervical Cancer Screening: Current ASCCP Guidelines reviewed. Last Pap: 2022 normal. Next Pap Due: 2 years, routine.  - Contraceptive counseling discussed. Current contraception: postmenopausal  - Breast Cancer Screening: Last Mammogram 10/2023 normal per pt  - Colorectal cancer screening last 2023, next due . - The following were reviewed in today's visit: mammography screening ordered, adequate intake of calcium and vitamin D, exercise, and healthy diet    Additional problems addressed during this visit:  1. Encounter for annual routine gynecological examination    2. Encounter for screening mammogram for malignant neoplasm of breast  -     Mammo screening bilateral w 3d & cad; Future    3. Flu vaccine need  -     influenza vaccine, quadrivalent, 0.5 mL, preservative-free, for adult and pediatric patients 6 mos+ (AFLURIA, FLUARIX, FLULAVAL, FLUZONE)        Next visit: 1 year Wellness      CC:  Annual Gynecologic Examination    HPI: Viktoria Qureshi is a 52 y.o.  who presents for annual gynecologic examination.   She denies any breast, urinary or pelvic issues at today's visit. Sexually active but no often, not interested. That's okay with her. Gyn History  No LMP recorded. Patient is postmenopausal.     Last Pap: 2022 was normal    She  reports being sexually active and has had partner(s) who are male. She reports using the following method of birth control/protection: Female Sterilization. OB History      Past Medical History:  No date: Anxiety and depression  No date: Colon polyp      Comment:  pt not sure but told to return in 5 years  10/2023: COVID-19  No date: Endometriosis of pelvis  10/2023: History of screening mammography      Comment:  Negative per pt. Done at [facility]  No date: Stress incontinence      Comment:  worse with allergy season     Past Surgical History:  2014: BLADDER SUSPENSION      Comment:  for stress incontinence   2014: CYSTOURETHROSCOPY  2016: ELBOW SURGERY      Comment:  tennis elbow   1985: HERNIA REPAIR  2014: STERILIZATION      Comment:  Essure     Family History   Problem Relation Age of Onset   • Breast cancer Neg Hx    • Uterine cancer Neg Hx    • Ovarian cancer Neg Hx    • Colon cancer Neg Hx         Social History     Tobacco Use   • Smoking status: Former     Types: Cigarettes     Quit date:      Years since quittin.9   • Smokeless tobacco: Never   Vaping Use   • Vaping Use: Never used   Substance Use Topics   • Alcohol use: Yes     Alcohol/week: 2.0 standard drinks of alcohol     Types: 2 Glasses of wine per week     Comment: daily   • Drug use: Never     Comment: No           Current Outpatient Medications:   •  Blood Glucose Monitoring Suppl (ONE TOUCH ULTRA 2) w/Device KIT, Use as directed, Disp: , Rfl:   •  diphenhydrAMINE (BENADRYL) 25 mg capsule, every 24 hours, Disp: , Rfl:   •  escitalopram (LEXAPRO) 20 mg tablet, TAKE 1 TABLET ONCE DAILY, Disp: , Rfl:   •  LORazepam (ATIVAN) 0.5 mg tablet, Take 0.5 mg by mouth, Disp: , Rfl:     She is allergic to dust mite extract and sulfa antibiotics. .    ROS negative except as noted in HPI    Objective:  /60   Ht 5' 6.5" (1.689 m)   Wt 64.7 kg (142 lb 9.6 oz)   Breastfeeding No   BMI 22.67 kg/m²      Physical Exam  Constitutional:       Appearance: Normal appearance. Chest:   Breasts:     Right: Normal. No mass or tenderness. Left: Normal. No mass or tenderness. Abdominal:      Palpations: Abdomen is soft. Tenderness: There is no abdominal tenderness. Genitourinary:     General: Normal vulva. Vagina: No bleeding or lesions. Cervix: Normal.      Uterus: Normal. Not tender. Adnexa:         Right: No mass or tenderness. Left: No mass or tenderness. Rectum: No mass or external hemorrhoid. Normal anal tone. Comments: Atrophic changes appropriate to age  Musculoskeletal:         General: Normal range of motion. Lymphadenopathy:      Upper Body:      Right upper body: No axillary adenopathy. Left upper body: No axillary adenopathy. Neurological:      Mental Status: She is alert and oriented to person, place, and time.    Psychiatric:         Mood and Affect: Mood normal.         Behavior: Behavior normal.

## 2025-01-17 ENCOUNTER — ANNUAL EXAM (OUTPATIENT)
Dept: OBGYN CLINIC | Facility: CLINIC | Age: 55
End: 2025-01-17
Payer: COMMERCIAL

## 2025-01-17 VITALS
WEIGHT: 156.4 LBS | DIASTOLIC BLOOD PRESSURE: 76 MMHG | BODY MASS INDEX: 23.7 KG/M2 | HEIGHT: 68 IN | SYSTOLIC BLOOD PRESSURE: 136 MMHG

## 2025-01-17 DIAGNOSIS — Z01.419 ENCOUNTER FOR ANNUAL ROUTINE GYNECOLOGICAL EXAMINATION: Primary | ICD-10-CM

## 2025-01-17 DIAGNOSIS — Z12.31 ENCOUNTER FOR SCREENING MAMMOGRAM FOR MALIGNANT NEOPLASM OF BREAST: ICD-10-CM

## 2025-01-17 PROCEDURE — S0612 ANNUAL GYNECOLOGICAL EXAMINA: HCPCS | Performed by: OBSTETRICS & GYNECOLOGY

## 2025-01-17 NOTE — LETTER
"2025     Ronna Pratt, DO  [address] [address]    Patient: Ronna Ortiz   YOB: 1970   Date of Visit: 2025       Dear Dr. Pratt:    Thank you for referring Ronna Ortiz to me for evaluation. Below are my notes for this consultation.    If you have questions, please do not hesitate to call me. I look forward to following your patient along with you.         Sincerely,        Natalia Hartmann MD        CC: No Recipients    Natalia Hartmann MD  2025  8:43 AM  Sign when Signing Visit  Annual Wellness Visit  [facility] - [address], [address], [place] [address]    ASSESSMENT/PLAN: Ronna Ortiz is a 54 y.o.  who presents for annual gynecologic exam.    Assessment & Plan  Encounter for annual routine gynecological examination  - Routine well woman exam completed today.  - Cervical Cancer Screening: Current ASCCP Guidelines reviewed. Last Pap: 2022 normal. Past abnormal pap: none.  Next Pap Due: 2 years.  - Contraceptive counseling discussed.  Current contraception: postmenopausal  - Breast Cancer Screening: Last Mammogram 10/2023 per pt normal  - Colorectal cancer screening last , next due .  - The following were reviewed in today's visit: mammography screening ordered, menopause, adequate intake of calcium and vitamin D, exercise, and healthy diet       Encounter for screening mammogram for malignant neoplasm of breast  Briefly reviewed dense breasts - pt states PCP ordered ultrasound in addition to mammogram for dense breasts.  I do not have mammo report to confirm.  Gave handout on breast density in AVS  Orders:  •  Mammo screening bilateral w 3d and cad; Future      Next visit: 1 year Wellness      CC:  Annual Gynecologic Examination    HPI: Ronna Ortiz is a 54 y.o.  who presents for annual gynecologic examination.  She denies any breast or urinary issues at today's visit.    \"Some skin growths outside " "vagina\", noted recently.  Kind of like skin tags.  No pain.    Sexually active but less frequent, no new partners.  No issues.    Still occasional hot flashes.  Was taking supplements in past, may restart.    Dr. Pratt ordered breast u/s as well for dense breasts.  No copy of last mammo in chart.        Gyn History  No LMP recorded. Patient is postmenopausal.     Last Pap: 2022 was normal    She  reports being sexually active and has had partner(s) who are male. She reports using the following method of birth control/protection: Female Sterilization.       OB History      Past Medical History:  No date: Anxiety and depression      Comment:  on medication.  managed by PCP  No date: Colon polyp      Comment:  pt not sure but told to return in 5 years  10/2023: COVID-19  No date: Endometriosis of pelvis  No date: Stress incontinence      Comment:  worse with allergy season     Past Surgical History:  2014: BLADDER SUSPENSION      Comment:  for stress incontinence   2014: CYSTOURETHROSCOPY  2016: ELBOW SURGERY      Comment:  tennis elbow   1985: HERNIA REPAIR  2014: STERILIZATION      Comment:  Essure     Family History   Problem Relation Age of Onset   • No Known Problems Mother    • Hyperlipidemia Father    • Hypertension Father    • Alcohol abuse Sister    • Alcohol abuse Brother    • No Known Problems Daughter    • No Known Problems Son    • Dementia Maternal Grandmother    • Alcohol abuse Maternal Grandfather    • Heart failure Paternal Grandmother    • Heart attack Paternal Grandfather    • Breast cancer Paternal Aunt    • Uterine cancer Neg Hx    • Ovarian cancer Neg Hx    • Colon cancer Neg Hx         Social History     Tobacco Use   • Smoking status: Former     Current packs/day: 0.00     Types: Cigarettes     Quit date:      Years since quittin.0   • Smokeless tobacco: Never   Vaping Use   • Vaping status: Never Used   Substance Use Topics   • Alcohol use: Yes     Alcohol/week: 2.0 " "standard drinks of alcohol     Types: 2 Glasses of wine per week     Comment: daily   • Drug use: Never     Comment: No           Current Outpatient Medications:   •  diphenhydrAMINE (BENADRYL) 25 mg capsule, every 24 hours, Disp: , Rfl:   •  LORazepam (ATIVAN) 0.5 mg tablet, Take 0.5 mg by mouth, Disp: , Rfl:   •  sertraline (ZOLOFT) 50 mg tablet, take 1 tablet by mouth every day at bedtime for 90 days, Disp: , Rfl:   •  Blood Glucose Monitoring Suppl (ONE TOUCH ULTRA 2) w/Device KIT, Use as directed (Patient not taking: Reported on 1/17/2025), Disp: , Rfl:     She is allergic to dust mite extract and sulfa antibiotics..    ROS negative except as noted in HPI    Objective:  /76 (BP Location: Left arm, Patient Position: Sitting, Cuff Size: Standard)   Ht 5' 7.75\" (1.721 m)   Wt 70.9 kg (156 lb 6.4 oz)   BMI 23.96 kg/m²      Physical Exam  Constitutional:       Appearance: Normal appearance.   Chest:   Breasts:     Right: Normal. No mass or tenderness.      Left: Normal. No mass or tenderness.   Abdominal:      Palpations: Abdomen is soft.      Tenderness: There is no abdominal tenderness.   Genitourinary:     General: Normal vulva.      Vagina: No bleeding or lesions.      Cervix: Normal.      Uterus: Normal. Not tender.       Adnexa:         Right: No mass or tenderness.          Left: No mass or tenderness.        Rectum: No mass or external hemorrhoid. Normal anal tone.      Comments: Atrophic changes appropriate to age; no skin tags or vulvar abnormalities - reassurance given.  Musculoskeletal:         General: Normal range of motion.   Lymphadenopathy:      Upper Body:      Right upper body: No axillary adenopathy.      Left upper body: No axillary adenopathy.   Neurological:      Mental Status: She is alert and oriented to person, place, and time.   Psychiatric:         Mood and Affect: Mood normal.         Behavior: Behavior normal.           "

## 2025-01-17 NOTE — PROGRESS NOTES
"Annual Wellness Visit  [facility] - [address], [address], [place] [address]    ASSESSMENT/PLAN: Ronna Ortiz is a 54 y.o.  who presents for annual gynecologic exam.    Assessment & Plan  Encounter for annual routine gynecological examination  - Routine well woman exam completed today.  - Cervical Cancer Screening: Current ASCCP Guidelines reviewed. Last Pap: 2022 normal. Past abnormal pap: none.  Next Pap Due: 2 years.  - Contraceptive counseling discussed.  Current contraception: postmenopausal  - Breast Cancer Screening: Last Mammogram 10/2023 per pt normal  - Colorectal cancer screening last , next due .  - The following were reviewed in today's visit: mammography screening ordered, menopause, adequate intake of calcium and vitamin D, exercise, and healthy diet       Encounter for screening mammogram for malignant neoplasm of breast  Briefly reviewed dense breasts - pt states PCP ordered ultrasound in addition to mammogram for dense breasts.  I do not have mammo report to confirm.  Gave handout on breast density in AVS  Orders:    Mammo screening bilateral w 3d and cad; Future      Next visit: 1 year Wellness      CC:  Annual Gynecologic Examination    HPI: Ronna Ortiz is a 54 y.o.  who presents for annual gynecologic examination.  She denies any breast or urinary issues at today's visit.    \"Some skin growths outside vagina\", noted recently.  Kind of like skin tags.  No pain.    Sexually active but less frequent, no new partners.  No issues.    Still occasional hot flashes.  Was taking supplements in past, may restart.    Dr. Pratt ordered breast u/s as well for dense breasts.  No copy of last mammo in chart.        Gyn History  No LMP recorded. Patient is postmenopausal.     Last Pap: 2022 was normal    She  reports being sexually active and has had partner(s) who are male. She reports using the following method of birth control/protection: " Female Sterilization.       OB History      Past Medical History:  No date: Anxiety and depression      Comment:  on medication.  managed by PCP  No date: Colon polyp      Comment:  pt not sure but told to return in 5 years  10/2023: COVID-19  No date: Endometriosis of pelvis  No date: Stress incontinence      Comment:  worse with allergy season     Past Surgical History:  2014: BLADDER SUSPENSION      Comment:  for stress incontinence   2014: CYSTOURETHROSCOPY  2016: ELBOW SURGERY      Comment:  tennis elbow   1985: HERNIA REPAIR  2014: STERILIZATION      Comment:  Essure     Family History   Problem Relation Age of Onset    No Known Problems Mother     Hyperlipidemia Father     Hypertension Father     Alcohol abuse Sister     Alcohol abuse Brother     No Known Problems Daughter     No Known Problems Son     Dementia Maternal Grandmother     Alcohol abuse Maternal Grandfather     Heart failure Paternal Grandmother     Heart attack Paternal Grandfather     Breast cancer Paternal Aunt     Uterine cancer Neg Hx     Ovarian cancer Neg Hx     Colon cancer Neg Hx         Social History     Tobacco Use    Smoking status: Former     Current packs/day: 0.00     Types: Cigarettes     Quit date:      Years since quittin.0    Smokeless tobacco: Never   Vaping Use    Vaping status: Never Used   Substance Use Topics    Alcohol use: Yes     Alcohol/week: 2.0 standard drinks of alcohol     Types: 2 Glasses of wine per week     Comment: daily    Drug use: Never     Comment: No           Current Outpatient Medications:     diphenhydrAMINE (BENADRYL) 25 mg capsule, every 24 hours, Disp: , Rfl:     LORazepam (ATIVAN) 0.5 mg tablet, Take 0.5 mg by mouth, Disp: , Rfl:     sertraline (ZOLOFT) 50 mg tablet, take 1 tablet by mouth every day at bedtime for 90 days, Disp: , Rfl:     Blood Glucose Monitoring Suppl (ONE TOUCH ULTRA 2) w/Device KIT, Use as directed (Patient not taking: Reported on 2025), Disp: , Rfl:     She  "is allergic to dust mite extract and sulfa antibiotics..    ROS negative except as noted in HPI    Objective:  /76 (BP Location: Left arm, Patient Position: Sitting, Cuff Size: Standard)   Ht 5' 7.75\" (1.721 m)   Wt 70.9 kg (156 lb 6.4 oz)   BMI 23.96 kg/m²      Physical Exam  Constitutional:       Appearance: Normal appearance.   Chest:   Breasts:     Right: Normal. No mass or tenderness.      Left: Normal. No mass or tenderness.   Abdominal:      Palpations: Abdomen is soft.      Tenderness: There is no abdominal tenderness.   Genitourinary:     General: Normal vulva.      Vagina: No bleeding or lesions.      Cervix: Normal.      Uterus: Normal. Not tender.       Adnexa:         Right: No mass or tenderness.          Left: No mass or tenderness.        Rectum: No mass or external hemorrhoid. Normal anal tone.      Comments: Atrophic changes appropriate to age; no skin tags or vulvar abnormalities - reassurance given.  Musculoskeletal:         General: Normal range of motion.   Lymphadenopathy:      Upper Body:      Right upper body: No axillary adenopathy.      Left upper body: No axillary adenopathy.   Neurological:      Mental Status: She is alert and oriented to person, place, and time.   Psychiatric:         Mood and Affect: Mood normal.         Behavior: Behavior normal.         "

## 2025-01-17 NOTE — PATIENT INSTRUCTIONS
- Maintain healthy weight with BMI ideally between 18-25.    - Eat a healthy diet, including multiple servings of vegetables and fruits, as well as lean protein sources.  - Get at least 150 minutes of moderate aerobic activity or 75 minutes of vigorous aerobic activity a week, or a combination of moderate and vigorous activity.  Greater amounts of exercise will provide an even greater health benefit.   - Ensure diet provides 1200mg of Calcium daily (divided) and 800IU of vitamin D, or take supplements to meet this.  - Safe sex practices recommended.    - Resources - information on birth control and sexually transmitted infections - www.bedsider.org            - information on sexually transmitted infections - www.cdc.gov/std/     Non-Hormonal Treatments for Menopausal Symptoms:  Hot Flashes, Night Sweats, Irritability    Behavior modification  - layers, wicking clothes, fans, lower temp  - Paced Breathing Technique (search for “paced breathing” online, apps also  available for your smartphone)  - 2-4x/day for 15-20 min and with onset of hot flash  - acupuncture therapy    Herbals - most take a trial of 12 weeks for response, most common side effect is upset                                stomach    - Estroven (commercial line of menopausal supplements)   - contains Black Cohosh, Soy isoflavones and other supplements   - have a range of symptom specific products    - Black Cohosh (North American plant root)   - commercially available as Remifemin   - 1 tab AM, 1 tab PM (20mg/tab)    - Evening Primrose Oil (American wildflower)   - 3-4mg daily with food    - Isoflavones found in dietary soy - avoid if personal history of Breast Cancer  - diet: 25gr soy/day   - equivalent to 1/2 cup dried soy beans, 1 cup tofu,   or 1/3 cup soy protein  - supplement over the counter: 50-120mg/day